# Patient Record
Sex: MALE | Race: OTHER
[De-identification: names, ages, dates, MRNs, and addresses within clinical notes are randomized per-mention and may not be internally consistent; named-entity substitution may affect disease eponyms.]

---

## 2019-08-21 ENCOUNTER — HOSPITAL ENCOUNTER (EMERGENCY)
Dept: HOSPITAL 72 - EMR | Age: 34
Discharge: HOME | End: 2019-08-21
Payer: MEDICAID

## 2019-08-21 VITALS — BODY MASS INDEX: 25.71 KG/M2 | HEIGHT: 66 IN | WEIGHT: 160 LBS

## 2019-08-21 VITALS — DIASTOLIC BLOOD PRESSURE: 69 MMHG | SYSTOLIC BLOOD PRESSURE: 112 MMHG

## 2019-08-21 VITALS — SYSTOLIC BLOOD PRESSURE: 112 MMHG | DIASTOLIC BLOOD PRESSURE: 69 MMHG

## 2019-08-21 DIAGNOSIS — Z88.6: ICD-10-CM

## 2019-08-21 DIAGNOSIS — Z88.0: ICD-10-CM

## 2019-08-21 DIAGNOSIS — K52.9: Primary | ICD-10-CM

## 2019-08-21 PROCEDURE — 99283 EMERGENCY DEPT VISIT LOW MDM: CPT

## 2019-08-21 NOTE — NUR
ED Nurse Note:

Patient walked into ED from home c/o abdominal pain on the lower area and 
diarrehea for 5 days. 

wife at bedside. patient is alert awake x4 ambulatory steady gait, breathing 
unlabored and even.

## 2019-08-21 NOTE — NUR
ED Nurse Note:



patient reports that he feels better, still has discomfort in the stomach but 
the pain went down to 3-4/10. wife at bedside.

## 2019-08-22 NOTE — EMERGENCY ROOM REPORT
History of Present Illness


General


Chief Complaint:  Abdominal Pain


Source:  Patient





Present Illness


HPI


Patient presented for nausea and diarrhea.  Multiple sick contacts at home with 

similar symptoms.  Wife had similar illness which resolved.  No bloody stools.  

No dizziness.  Intermittent abdominal cramping lasting several minutes at a 

time.  Watery diarrhea.  No fever.


Allergies:  


Coded Allergies:  


     ASPIRIN (Verified  Allergy, Unknown, 8/21/19)


     PENICILLINS (Verified  Allergy, Unknown, 8/21/19)





Patient History


Past Medical History:  see triage record


Past Surgical History:  none


Reviewed Nursing Documentation:  PMH: Agreed; PSxH: Agreed





Nursing Documentation-PMH


Past Medical History:  No Stated History





Review of Systems


All Other Systems:  negative except mentioned in HPI





Physical Exam





Vital Signs








  Date Time  Temp Pulse Resp B/P (MAP) Pulse Ox O2 Delivery O2 Flow Rate FiO2


 


8/21/19 09:59 98.8 68 20 112/69 95 Room Air  








Sp02 EP Interpretation:  reviewed, normal


General Appearance:  normal inspection, well appearing, no apparent distress, 

alert, GCS 15, non-toxic


Head:  atraumatic


ENT:  normal ENT inspection, hearing grossly normal, normal voice


Neck:  normal inspection, full range of motion, supple, no bony tend


Respiratory:  normal inspection, lungs clear, normal breath sounds, no 

respiratory distress, no retraction, no wheezing


Cardiovascular #1:  regular rate, rhythm, no edema


Gastrointestinal:  normal inspection, normal bowel sounds, non tender, soft, non

-distended, no guarding, no hernia


Genitourinary:  no CVA tenderness


Musculoskeletal:  normal inspection, back normal, normal range of motion


Neurologic:  normal inspection, alert, oriented x3, responsive, CNs III-XII nml 

as tested, speech normal


Psychiatric:  normal inspection, judgement/insight normal, mood/affect normal





Medical Decision Making


Diagnostic Impression:  


 Primary Impression:  


 Gastroenteritis


ER Course


Patient presented for diarrhea.





Differential Diagnosis: A differential includes but is not limited to: viral 

gastroenteritis, diabetes, appendicitis, infectious process, food allergy, GERD

, bowel obstruction, constipation, malrotation, ileus, viral illness, 

gastroparesis, colitis, clostridium difficile, enteroinvasive infection.








The patient presents with loose stools/diarrhea, without evidence for more 

malignant underlying process or invasive infection.  There is no evidence 

clinically for significant dehydration. 





The patient denies recent antibiotic use.  No fever or reported blood in stool. 





Based on the patient's current exam and history, including duration of symptoms

, the patient appears to be a good candidate for outpatient symptomatic therapy 

and prompt follow up if symptoms continue or worsen. 





There is no evidence for more malignant etiologies for the patient's symptoms 

at this time.  I discussed the possibility of more malignant covert etiologies 

with patient.  The patient understands this possibility and will follow up or 

call immediately with worsening symptoms.





Last Vital Signs








  Date Time  Temp Pulse Resp B/P (MAP) Pulse Ox O2 Delivery O2 Flow Rate FiO2


 


8/21/19 12:14 98.8  20 112/69 95 Room Air  


 


8/21/19 10:08  68      








Status:  improved


Disposition:  HOME, SELF-CARE


Condition:  Stable


Scripts


Dicyclomine Hcl* (DICYCLOMINE HCL*) 10 Mg Capsule


10 MG ORAL QID, #20 CAP


   Prov: Pee Corcoran MD         8/21/19 


Ondansetron (Zofran) 4 Mg Tablet


4 MG ORAL Q6H PRN for Nausea & Vomiting, #30 TAB 0 Refills


   Prov: Pee Corcoran MD         8/21/19


Departure Forms:  Return to Work   Return to Work in (Days):  3


Patient Instructions:  Abdominal Pain, Adult











Pee Corcoran MD Aug 22, 2019 11:50